# Patient Record
Sex: FEMALE | Race: WHITE
[De-identification: names, ages, dates, MRNs, and addresses within clinical notes are randomized per-mention and may not be internally consistent; named-entity substitution may affect disease eponyms.]

---

## 2020-11-12 ENCOUNTER — HOSPITAL ENCOUNTER (OUTPATIENT)
Dept: HOSPITAL 92 - LABBT | Age: 60
Discharge: HOME | End: 2020-11-12
Attending: NEUROLOGICAL SURGERY
Payer: MEDICARE

## 2020-11-12 DIAGNOSIS — Z20.828: ICD-10-CM

## 2020-11-12 DIAGNOSIS — Z01.818: Primary | ICD-10-CM

## 2020-11-12 DIAGNOSIS — M54.12: ICD-10-CM

## 2020-11-12 LAB
ANION GAP SERPL CALC-SCNC: 13 MMOL/L (ref 10–20)
BUN SERPL-MCNC: 11 MG/DL (ref 9.8–20.1)
CALCIUM SERPL-MCNC: 8.9 MG/DL (ref 7.8–10.44)
CHLORIDE SERPL-SCNC: 111 MMOL/L (ref 98–107)
CO2 SERPL-SCNC: 22 MMOL/L (ref 22–29)
CREAT CL PREDICTED SERPL C-G-VRATE: 0 ML/MIN (ref 70–130)
GLUCOSE SERPL-MCNC: 57 MG/DL (ref 70–105)
HGB BLD-MCNC: 13.8 G/DL (ref 12–16)
MCH RBC QN AUTO: 31.3 PG (ref 27–33)
MCV RBC AUTO: 101.1 FL (ref 80–100)
PLATELET # BLD AUTO: 392 10X3/UL (ref 130–400)
POTASSIUM SERPL-SCNC: 4.1 MMOL/L (ref 3.5–5.1)
RBC # BLD AUTO: 4.41 10X6/UL (ref 3.9–5.2)
SODIUM SERPL-SCNC: 142 MMOL/L (ref 136–145)
WBC # BLD AUTO: 6.6 10X3/UL (ref 4.5–11)

## 2020-11-12 PROCEDURE — 93010 ELECTROCARDIOGRAM REPORT: CPT

## 2020-11-12 PROCEDURE — 85027 COMPLETE CBC AUTOMATED: CPT

## 2020-11-12 PROCEDURE — 87635 SARS-COV-2 COVID-19 AMP PRB: CPT

## 2020-11-12 PROCEDURE — 93005 ELECTROCARDIOGRAM TRACING: CPT

## 2020-11-12 PROCEDURE — U0003 INFECTIOUS AGENT DETECTION BY NUCLEIC ACID (DNA OR RNA); SEVERE ACUTE RESPIRATORY SYNDROME CORONAVIRUS 2 (SARS-COV-2) (CORONAVIRUS DISEASE [COVID-19]), AMPLIFIED PROBE TECHNIQUE, MAKING USE OF HIGH THROUGHPUT TECHNOLOGIES AS DESCRIBED BY CMS-2020-01-R: HCPCS

## 2020-11-12 PROCEDURE — 80048 BASIC METABOLIC PNL TOTAL CA: CPT

## 2020-11-16 ENCOUNTER — HOSPITAL ENCOUNTER (OUTPATIENT)
Dept: HOSPITAL 92 - SDC | Age: 60
Setting detail: OBSERVATION
LOS: 1 days | Discharge: HOME | End: 2020-11-17
Attending: NEUROLOGICAL SURGERY | Admitting: NEUROLOGICAL SURGERY
Payer: MEDICARE

## 2020-11-16 VITALS — BODY MASS INDEX: 29.5 KG/M2

## 2020-11-16 DIAGNOSIS — Z88.0: ICD-10-CM

## 2020-11-16 DIAGNOSIS — D64.9: ICD-10-CM

## 2020-11-16 DIAGNOSIS — F32.9: ICD-10-CM

## 2020-11-16 DIAGNOSIS — Z88.8: ICD-10-CM

## 2020-11-16 DIAGNOSIS — M54.12: Primary | ICD-10-CM

## 2020-11-16 DIAGNOSIS — Z91.048: ICD-10-CM

## 2020-11-16 DIAGNOSIS — G47.30: ICD-10-CM

## 2020-11-16 DIAGNOSIS — I10: ICD-10-CM

## 2020-11-16 DIAGNOSIS — Z79.899: ICD-10-CM

## 2020-11-16 DIAGNOSIS — J45.909: ICD-10-CM

## 2020-11-16 DIAGNOSIS — Z91.040: ICD-10-CM

## 2020-11-16 DIAGNOSIS — Z88.1: ICD-10-CM

## 2020-11-16 DIAGNOSIS — M19.90: ICD-10-CM

## 2020-11-16 DIAGNOSIS — Z88.2: ICD-10-CM

## 2020-11-16 DIAGNOSIS — E03.9: ICD-10-CM

## 2020-11-16 PROCEDURE — 0RG2070 FUSION OF 2 OR MORE CERVICAL VERTEBRAL JOINTS WITH AUTOLOGOUS TISSUE SUBSTITUTE, ANTERIOR APPROACH, ANTERIOR COLUMN, OPEN APPROACH: ICD-10-PCS | Performed by: NEUROLOGICAL SURGERY

## 2020-11-16 PROCEDURE — C1713 ANCHOR/SCREW BN/BN,TIS/BN: HCPCS

## 2020-11-16 PROCEDURE — 96365 THER/PROPH/DIAG IV INF INIT: CPT

## 2020-11-16 PROCEDURE — 0RT30ZZ RESECTION OF CERVICAL VERTEBRAL DISC, OPEN APPROACH: ICD-10-PCS | Performed by: NEUROLOGICAL SURGERY

## 2020-11-16 PROCEDURE — 0RG20A0 FUSION OF 2 OR MORE CERVICAL VERTEBRAL JOINTS WITH INTERBODY FUSION DEVICE, ANTERIOR APPROACH, ANTERIOR COLUMN, OPEN APPROACH: ICD-10-PCS | Performed by: NEUROLOGICAL SURGERY

## 2020-11-16 PROCEDURE — 20936 SP BONE AGRFT LOCAL ADD-ON: CPT

## 2020-11-16 PROCEDURE — 22552 ARTHRD ANT NTRBD CERVICAL EA: CPT

## 2020-11-16 PROCEDURE — 22551 ARTHRD ANT NTRBDY CERVICAL: CPT

## 2020-11-16 PROCEDURE — 76000 FLUOROSCOPY <1 HR PHYS/QHP: CPT

## 2020-11-16 PROCEDURE — G0378 HOSPITAL OBSERVATION PER HR: HCPCS

## 2020-11-16 PROCEDURE — C1776 JOINT DEVICE (IMPLANTABLE): HCPCS

## 2020-11-16 PROCEDURE — 96375 TX/PRO/DX INJ NEW DRUG ADDON: CPT

## 2020-11-16 PROCEDURE — 22853 INSJ BIOMECHANICAL DEVICE: CPT

## 2020-11-16 PROCEDURE — 96376 TX/PRO/DX INJ SAME DRUG ADON: CPT

## 2020-11-16 PROCEDURE — 20930 SP BONE ALGRFT MORSEL ADD-ON: CPT

## 2020-11-16 PROCEDURE — 94640 AIRWAY INHALATION TREATMENT: CPT

## 2020-11-16 RX ADMIN — CLINDAMYCIN PHOSPHATE SCH MLS: 900 INJECTION, SOLUTION INTRAVENOUS at 21:49

## 2020-11-16 RX ADMIN — MOMETASONE FUROATE AND FORMOTEROL FUMARATE DIHYDRATE SCH PUFF: 200; 5 AEROSOL RESPIRATORY (INHALATION) at 19:59

## 2020-11-16 RX ADMIN — CLINDAMYCIN PHOSPHATE SCH MLS: 900 INJECTION, SOLUTION INTRAVENOUS at 15:00

## 2020-11-16 RX ADMIN — BUPROPION HYDROCHLORIDE SCH MG: 100 TABLET, EXTENDED RELEASE ORAL at 20:48

## 2020-11-16 NOTE — OP
DATE OF PROCEDURE:  11/16/2020



ASSISTANT:  Gabriel.



PROCEDURES PERFORMED:  Anterior cervical diskectomy at C4-C5 and C5-C6.  Interbody

arthrodesis, intervertebral biomechanical device, local morselized autograft,

demineralized bone matrix, anterior titanium instrumentation, C4-C5 and C5-C6. 



DESCRIPTION OF PROCEDURE:  The patient was brought to the operating room and

intubated.  She was positioned supine with head in modest extension on a gel-filled

donut.  An incision was made in the right precervical area and dissected medial to

the sternocleidomastoid muscle.  We identified the anterior cervical spine and the

level was confirmed by x-ray.  We debrided the anterior osteophytes, placed

distraction across the disk space, and using operative microscope and

microdissection techniques, completely decompressed the intervertebral disks from

foramen to foramen at C4-C5 and C5-C6.  The bony endplates were then decorticated

for the purpose of arthrodesis and appropriate-sized intervertebral biomechanical

PEEK device was brought into the field, filled with demineralized bone matrix and

local morselized autograft, and tapped in place securely at C4-C5 and C5-C6.  Next,

an anterior plate was brought into the field and secured to C4, C5, and C6 using two

14-mm screws at each level.  The plate was separate and discrete from the

intervertebral devices and not integral to them.  The wound was extensively

irrigated.  Immaculate hemostasis was secured and the wound was closed in anatomic

layers. 







Job ID:  [id number]

## 2020-11-17 VITALS — TEMPERATURE: 97.9 F | SYSTOLIC BLOOD PRESSURE: 109 MMHG | DIASTOLIC BLOOD PRESSURE: 70 MMHG

## 2020-11-17 RX ADMIN — BUPROPION HYDROCHLORIDE SCH MG: 100 TABLET, EXTENDED RELEASE ORAL at 14:01

## 2020-11-17 RX ADMIN — CLINDAMYCIN PHOSPHATE SCH MLS: 900 INJECTION, SOLUTION INTRAVENOUS at 05:01

## 2020-11-17 RX ADMIN — MOMETASONE FUROATE AND FORMOTEROL FUMARATE DIHYDRATE SCH PUFF: 200; 5 AEROSOL RESPIRATORY (INHALATION) at 07:11

## 2020-11-17 RX ADMIN — CLINDAMYCIN PHOSPHATE SCH: 900 INJECTION, SOLUTION INTRAVENOUS at 14:42

## 2020-11-17 RX ADMIN — BUPROPION HYDROCHLORIDE SCH: 100 TABLET, EXTENDED RELEASE ORAL at 11:21

## 2020-11-17 RX ADMIN — OXYCODONE HYDROCHLORIDE AND ACETAMINOPHEN PRN TAB: 5; 325 TABLET ORAL at 14:01

## 2020-11-17 RX ADMIN — OXYCODONE HYDROCHLORIDE AND ACETAMINOPHEN PRN TAB: 5; 325 TABLET ORAL at 08:49

## 2020-11-17 NOTE — DIS
DATE OF ADMISSION:  11/16/2020



DATE OF DISCHARGE:  11/17/2020



PROCEDURE:  C4-C6 anterior cervical discectomy and fusion.



DISCHARGE SUMMARY:  The patient is a 60-year-old female, recently evaluated in our

office for chronic neck pain and found to have severe degenerative changes from

C4-C6.  She underwent C4-C6 ACDF on 

11/16/2020.  Following the surgery, she was transitioned to the Med/Surg floor,

where her pain has been well controlled with p.o. medications, she is tolerating a

regular diet, and she is voiding appropriately.  She has been ambulating in the

halls easily.  She had a GUI drain placed intraoperatively, which had minimal output

over the first night, only 15 mL, and this was removed on postoperative day #1.  We

will plan to dismiss the patient to home.  I have discussed home care precautions

and will follow up with the patient in 2 weeks.  Her pain management physician has

already provided her postoperative pain medication oxycodone. 







Job ID:  [id number]

## 2020-11-17 NOTE — EKG
Test Reason : PREOP

Blood Pressure : ***/*** mmHG

Vent. Rate : 074 BPM     Atrial Rate : 074 BPM

   P-R Int : 212 ms          QRS Dur : 086 ms

    QT Int : 382 ms       P-R-T Axes : [phone] degrees

   QTc Int : 424 ms

 

Sinus rhythm with 1st degree A-V block with occasional Premature ventricular complexes

Septal infarct , age undetermined

Abnormal ECG

No previous ECGs available

Confirmed by SADAF VARGAS MD ([id number]) on 11/17/2020 6:53:43 AM

 

Referred By:  AMANDA           Confirmed By:SADAF VARGAS MD